# Patient Record
Sex: MALE | URBAN - METROPOLITAN AREA
[De-identification: names, ages, dates, MRNs, and addresses within clinical notes are randomized per-mention and may not be internally consistent; named-entity substitution may affect disease eponyms.]

---

## 2021-11-29 ENCOUNTER — OFFICE VISIT (OUTPATIENT)
Dept: URBAN - METROPOLITAN AREA CLINIC 36 | Facility: CLINIC | Age: 81
End: 2021-11-29

## 2021-11-29 DIAGNOSIS — H25.12 AGE-RELATED NUCLEAR CATARACT, LEFT EYE: ICD-10-CM

## 2021-11-29 DIAGNOSIS — H35.3231 EXUDATIVE AGE-REL MCLR DEGN, BI, WITH ACTV CHRDL NEOVAS: Primary | ICD-10-CM

## 2021-11-29 DIAGNOSIS — Z96.1 PRESENCE OF INTRAOCULAR LENS: ICD-10-CM

## 2021-11-29 PROCEDURE — 92134 CPTRZ OPH DX IMG PST SGM RTA: CPT | Performed by: OPHTHALMOLOGY

## 2021-11-29 PROCEDURE — 99203 OFFICE O/P NEW LOW 30 MIN: CPT | Performed by: OPHTHALMOLOGY

## 2021-11-29 ASSESSMENT — INTRAOCULAR PRESSURE
OD: 12
OS: 15

## 2021-11-29 NOTE — IMPRESSION/PLAN
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 Plan: Exam and OCT demonstrate PED OU and IRF OD and possible scar OS. 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 The diagnosis, natural history, and prognosis of wet AMD were discussed. The R/B/As of various treatment options including observation, laser (PDT), and intravitreal Anti-VEGF injections were discussed. Participation in a clinical trial was also discussed. The patient understands that treatment may not improve vision, but should reduce the risk of further visual loss. The patient also understands the likely need for chronic treatment and the risk of vision loss without treatment. Recommend intravitreal Avastin injection today. R/B/A discussed and patient elects to proceed. Will schedule due to insurance authorization. 

RTC 1-2 wk with OCT OU, straight Avastin #1/1

## 2021-11-29 NOTE — IMPRESSION/PLAN
Impression: Age-related nuclear cataract, left eye: H25.12. Plan:  May be VS; rec observation until AMD is treated OS

## 2021-12-13 ENCOUNTER — PROCEDURE (OUTPATIENT)
Dept: URBAN - METROPOLITAN AREA CLINIC 36 | Facility: CLINIC | Age: 81
End: 2021-12-13
Payer: MEDICARE

## 2021-12-13 PROCEDURE — 92134 CPTRZ OPH DX IMG PST SGM RTA: CPT | Performed by: OPHTHALMOLOGY

## 2021-12-13 ASSESSMENT — INTRAOCULAR PRESSURE
OD: 18
OS: 18

## 2022-01-10 ENCOUNTER — OFFICE VISIT (OUTPATIENT)
Dept: URBAN - METROPOLITAN AREA CLINIC 36 | Facility: CLINIC | Age: 82
End: 2022-01-10
Payer: MEDICARE

## 2022-01-10 PROCEDURE — 92014 COMPRE OPH EXAM EST PT 1/>: CPT | Performed by: OPHTHALMOLOGY

## 2022-01-10 ASSESSMENT — INTRAOCULAR PRESSURE
OS: 14
OD: 19

## 2022-01-10 NOTE — IMPRESSION/PLAN
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
-s/p Avastin last 12/13/2021 Plan: Exam and OCT demonstrate PED OU and improved IRF OD and improved SRH around scar OS after initiation of antiVEGF theraphy. Recommend intravitreal Avastin injection today. R/B/A discussed and patient elects to proceed. Intravitreal Avastin injected OU today without complication. 

RTC 4 wk with OCT OU, re-eval Avastin OU

## 2022-02-07 ENCOUNTER — OFFICE VISIT (OUTPATIENT)
Dept: URBAN - METROPOLITAN AREA CLINIC 36 | Facility: CLINIC | Age: 82
End: 2022-02-07
Payer: MEDICARE

## 2022-02-07 PROCEDURE — 92012 INTRM OPH EXAM EST PATIENT: CPT | Performed by: OPHTHALMOLOGY

## 2022-02-07 PROCEDURE — 92134 CPTRZ OPH DX IMG PST SGM RTA: CPT | Performed by: OPHTHALMOLOGY

## 2022-02-07 ASSESSMENT — INTRAOCULAR PRESSURE
OS: 20
OD: 17

## 2022-02-07 NOTE — IMPRESSION/PLAN
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
-s/p Avastin last 01/10/2022 Plan: Exam and OCT demonstrate PED OU and improved IRF OD and improved SRH around scar OS after initiation of antiVEGF therapy. Vision is improved. Recommend intravitreal Avastin injection today and continue with tx until cataract surgery OS. R/B/A discussed and patient elects to proceed. Intravitreal Avastin injected OU today without complication. 

RTC 4 wk with OCT OU, re-eval Avastin OU

## 2022-02-07 NOTE — IMPRESSION/PLAN
Impression: Age-related nuclear cataract, left eye: H25.12. Plan: May be VS; potential unclear given retina. Patient is cleared to proceed with cataract surgery from retina perspective if indicated.

## 2022-03-07 ENCOUNTER — OFFICE VISIT (OUTPATIENT)
Dept: URBAN - METROPOLITAN AREA CLINIC 36 | Facility: CLINIC | Age: 82
End: 2022-03-07
Payer: MEDICARE

## 2022-03-07 DIAGNOSIS — H35.373 PUCKERING OF MACULA, BILATERAL: ICD-10-CM

## 2022-03-07 DIAGNOSIS — H04.123 DRY EYE SYNDROME OF BILATERAL LACRIMAL GLANDS: ICD-10-CM

## 2022-03-07 PROCEDURE — 99214 OFFICE O/P EST MOD 30 MIN: CPT | Performed by: OPHTHALMOLOGY

## 2022-03-07 PROCEDURE — 92134 CPTRZ OPH DX IMG PST SGM RTA: CPT | Performed by: OPHTHALMOLOGY

## 2022-03-07 ASSESSMENT — INTRAOCULAR PRESSURE
OS: 18
OD: 15

## 2022-03-07 NOTE — IMPRESSION/PLAN
Impression: Dry eye syndrome of bilateral lacrimal glands: H04.123. Plan: Patient notes tearing. Exam demonstrates worse PEE. Discussed R/B/A of ATs, PFATs, Restasis, vs punctal plugs.  Rec ATs

## 2022-03-07 NOTE — IMPRESSION/PLAN
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
-s/p Avastin last 01/10/2022 Plan: Exam and OCT demonstrate PED OU and improved IRF OD and improved SRH around scar OS after initiation of antiVEGF therapy. Vision is improved. Recommend intravitreal Avastin injection today and continue with tx until cataract surgery OS. R/B/A discussed and patient elects to proceed. Intravitreal Avastin injected OU today without complication. 

RTC 4 wk with OCT OU, re-eval Avastin OU (MAY BE IN [place])

## 2022-03-07 NOTE — IMPRESSION/PLAN
Impression: Puckering of macula, bilateral: H35.373. Plan: Exam and OCT demonstrate a Macular Pucker. The diagnosis, natural history, and prognosis of ERMs, as well as the risks and benefits of PPV/MP versus observation were discussed at length. Given the patient's concurrent retinal pathology, observation was recommended at this time.

## 2022-09-20 ENCOUNTER — OFFICE VISIT (OUTPATIENT)
Dept: URBAN - METROPOLITAN AREA CLINIC 13 | Facility: CLINIC | Age: 82
End: 2022-09-20
Payer: MEDICARE

## 2022-09-20 DIAGNOSIS — H35.373 PUCKERING OF MACULA, BILATERAL: ICD-10-CM

## 2022-09-20 DIAGNOSIS — Z96.1 PRESENCE OF INTRAOCULAR LENS: ICD-10-CM

## 2022-09-20 DIAGNOSIS — H25.12 AGE-RELATED NUCLEAR CATARACT, LEFT EYE: ICD-10-CM

## 2022-09-20 DIAGNOSIS — H04.123 DRY EYE SYNDROME OF BILATERAL LACRIMAL GLANDS: ICD-10-CM

## 2022-09-20 DIAGNOSIS — H35.3231 EXUDATIVE AGE-REL MCLR DEGN, BI, WITH ACTV CHRDL NEOVAS: Primary | ICD-10-CM

## 2022-09-20 PROCEDURE — 92014 COMPRE OPH EXAM EST PT 1/>: CPT | Performed by: OPHTHALMOLOGY

## 2022-09-20 PROCEDURE — 67028 INJECTION EYE DRUG: CPT | Performed by: OPHTHALMOLOGY

## 2022-09-20 PROCEDURE — 92134 CPTRZ OPH DX IMG PST SGM RTA: CPT | Performed by: OPHTHALMOLOGY

## 2022-09-20 ASSESSMENT — INTRAOCULAR PRESSURE
OS: 15
OD: 14

## 2022-09-20 NOTE — IMPRESSION/PLAN
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
-s/p Avastin last 3/2022 Plan: Patient notes stable vision after Avastin #3. He did not receive antiVEGF tx in [place] and fortunately, he did not note a decline. Exam and OCT demonstrate PED OU and improved IRF OD and improved SRH around scar OS after initiation of antiVEGF therapy. Vision is improved. Recommend intravitreal Avastin injection today and continue with tx at a long tx interval.  R/B/A discussed and patient elects to proceed. Intravitreal Avastin injected OU today without complication. 

RTC 3-4 mo with OCT OU, re-eval Avastin OU (MAY BE IN [place])

## 2022-12-22 ENCOUNTER — OFFICE VISIT (OUTPATIENT)
Dept: URBAN - METROPOLITAN AREA CLINIC 32 | Facility: CLINIC | Age: 82
End: 2022-12-22
Payer: MEDICARE

## 2022-12-22 DIAGNOSIS — H52.4 PRESBYOPIA: ICD-10-CM

## 2022-12-22 DIAGNOSIS — H35.373 PUCKERING OF MACULA, BILATERAL: Primary | ICD-10-CM

## 2022-12-22 DIAGNOSIS — H54.50 LOW VISION, ONE EYE, UNSPECIFIED EYE: ICD-10-CM

## 2022-12-22 PROCEDURE — 99203 OFFICE O/P NEW LOW 30 MIN: CPT | Performed by: OPTOMETRIST

## 2022-12-22 ASSESSMENT — VISUAL ACUITY
OD: 20/250
OS: CF 5FT

## 2022-12-22 ASSESSMENT — INTRAOCULAR PRESSURE
OD: 10
OS: 11

## 2022-12-22 ASSESSMENT — KERATOMETRY: OS: 40.50

## 2022-12-22 NOTE — IMPRESSION/PLAN
Impression: Puckering of macula, bilateral: H35.373. Plan: Discussed diagnosis in detail with patient. Continue care with Dr. Yohana Vega.

## 2022-12-22 NOTE — IMPRESSION/PLAN
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 Plan: Finalized new glasses Rx. Patient education on appropriate options of eye glasses. Gave high add bifocal RX. Recommend ADL consider magnifiers if needed. Recommend: Aubrey 4x Hand Magnifier, Aubrey Smart [employer], LUCY Lights(often available at [employer] or [employer]), Large TV 65'' to 82,'' Audible Books, MaxTV/Max Detail Discussed: ORCAM, iPhone/iPad, Audible Assistants([employer])

## 2023-01-09 ENCOUNTER — OFFICE VISIT (OUTPATIENT)
Dept: URBAN - METROPOLITAN AREA CLINIC 36 | Facility: CLINIC | Age: 83
End: 2023-01-09
Payer: MEDICARE

## 2023-01-09 DIAGNOSIS — Z96.1 PRESENCE OF INTRAOCULAR LENS: ICD-10-CM

## 2023-01-09 DIAGNOSIS — H04.123 DRY EYE SYNDROME OF BILATERAL LACRIMAL GLANDS: ICD-10-CM

## 2023-01-09 DIAGNOSIS — H25.12 AGE-RELATED NUCLEAR CATARACT, LEFT EYE: ICD-10-CM

## 2023-01-09 DIAGNOSIS — H35.373 PUCKERING OF MACULA, BILATERAL: ICD-10-CM

## 2023-01-09 DIAGNOSIS — H35.3231 EXUDATIVE AGE-REL MCLR DEGN, BI, WITH ACTV CHRDL NEOVAS: Primary | ICD-10-CM

## 2023-01-09 PROCEDURE — 92134 CPTRZ OPH DX IMG PST SGM RTA: CPT | Performed by: OPHTHALMOLOGY

## 2023-01-09 PROCEDURE — 99214 OFFICE O/P EST MOD 30 MIN: CPT | Performed by: OPHTHALMOLOGY

## 2023-01-09 ASSESSMENT — INTRAOCULAR PRESSURE
OD: 12
OS: 17

## 2023-01-09 NOTE — IMPRESSION/PLAN
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
-s/p Avastin last 09/20/2022 Plan: Patient notes stable vision after Avastin #3. He did not receive antiVEGF tx in [place] and fortunately, he did not note a decline. Exam and OCT demonstrate PED OU and improved IRF OD and improved SRH around scar OS after initiation of antiVEGF therapy. Vision is improved. Recommend intravitreal Avastin injection today and continue with tx at a long tx interval.  R/B/A discussed and patient elects to proceed. Intravitreal Avastin injected OU today without complication. 

RTC 3-4 mo with OCT OU, re-eval Avastin OU (MAY BE IN [place])

## 2023-05-01 ENCOUNTER — OFFICE VISIT (OUTPATIENT)
Dept: URBAN - METROPOLITAN AREA CLINIC 36 | Facility: CLINIC | Age: 83
End: 2023-05-01
Payer: MEDICARE

## 2023-05-01 DIAGNOSIS — H35.373 PUCKERING OF MACULA, BILATERAL: ICD-10-CM

## 2023-05-01 DIAGNOSIS — H35.3231 EXUDATIVE AGE-REL MCLR DEGN, BI, WITH ACTV CHRDL NEOVAS: Primary | ICD-10-CM

## 2023-05-01 DIAGNOSIS — Z96.1 PRESENCE OF INTRAOCULAR LENS: ICD-10-CM

## 2023-05-01 DIAGNOSIS — H04.123 DRY EYE SYNDROME OF BILATERAL LACRIMAL GLANDS: ICD-10-CM

## 2023-05-01 PROCEDURE — 99214 OFFICE O/P EST MOD 30 MIN: CPT | Performed by: OPHTHALMOLOGY

## 2023-05-01 PROCEDURE — 92134 CPTRZ OPH DX IMG PST SGM RTA: CPT | Performed by: OPHTHALMOLOGY

## 2023-05-01 ASSESSMENT — INTRAOCULAR PRESSURE
OS: 17
OD: 16

## 2023-05-01 NOTE — IMPRESSION/PLAN
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
-s/p Avastin last 01/09/2023 Plan: Patient notes stable vision after Avastin. Exam and OCT demonstrate PED OU and persistent IRF OD and improved SRH around scar OS after initiation of antiVEGF therapy. Vision is improved. Recommend switch to Vabysmo injection today and continue with tx at a long tx interval.  R/B/A discussed and patient elects to proceed. Intravitreal Vabysmo injected OU today without complication. 

RTC 3-4 mo with OCT OU, re-eval Vabysmo OU (MAY BE IN [place])

## 2023-12-21 ENCOUNTER — OFFICE VISIT (OUTPATIENT)
Dept: URBAN - METROPOLITAN AREA CLINIC 35 | Facility: CLINIC | Age: 83
End: 2023-12-21
Payer: MEDICARE

## 2023-12-21 DIAGNOSIS — H35.3231 EXUDATIVE AGE-REL MCLR DEGN, BI, WITH ACTV CHRDL NEOVAS: Primary | ICD-10-CM

## 2023-12-21 DIAGNOSIS — H35.373 PUCKERING OF MACULA, BILATERAL: ICD-10-CM

## 2023-12-21 PROCEDURE — 92134 CPTRZ OPH DX IMG PST SGM RTA: CPT | Performed by: OPHTHALMOLOGY

## 2023-12-21 PROCEDURE — 99213 OFFICE O/P EST LOW 20 MIN: CPT | Performed by: OPHTHALMOLOGY

## 2023-12-21 ASSESSMENT — INTRAOCULAR PRESSURE
OS: 10
OD: 13

## 2024-09-30 ENCOUNTER — OFFICE VISIT (OUTPATIENT)
Facility: LOCATION | Age: 84
End: 2024-09-30
Payer: MEDICARE

## 2024-09-30 DIAGNOSIS — H04.123 DRY EYE SYNDROME OF BILATERAL LACRIMAL GLANDS: ICD-10-CM

## 2024-09-30 DIAGNOSIS — Z96.1 PRESENCE OF INTRAOCULAR LENS: ICD-10-CM

## 2024-09-30 DIAGNOSIS — H35.373 PUCKERING OF MACULA, BILATERAL: ICD-10-CM

## 2024-09-30 DIAGNOSIS — H35.3231 EXUDATIVE AGE-RELATED MACULAR DEGENERATION, BILATERAL, WITH ACTIVE CHOROIDAL NEOVASCULARIZATION: Primary | ICD-10-CM

## 2024-09-30 PROCEDURE — 92134 CPTRZ OPH DX IMG PST SGM RTA: CPT | Performed by: OPHTHALMOLOGY

## 2024-09-30 PROCEDURE — 92014 COMPRE OPH EXAM EST PT 1/>: CPT | Performed by: OPHTHALMOLOGY

## 2024-09-30 ASSESSMENT — INTRAOCULAR PRESSURE
OS: 18
OD: 14

## 2025-02-03 ENCOUNTER — OFFICE VISIT (OUTPATIENT)
Facility: LOCATION | Age: 85
End: 2025-02-03
Payer: MEDICARE

## 2025-02-03 DIAGNOSIS — H35.373 PUCKERING OF MACULA, BILATERAL: ICD-10-CM

## 2025-02-03 DIAGNOSIS — Z96.1 PRESENCE OF INTRAOCULAR LENS: ICD-10-CM

## 2025-02-03 DIAGNOSIS — H35.3231 EXUDATIVE AGE-RELATED MACULAR DEGENERATION, BILATERAL, WITH ACTIVE CHOROIDAL NEOVASCULARIZATION: Primary | ICD-10-CM

## 2025-02-03 DIAGNOSIS — H04.123 DRY EYE SYNDROME OF BILATERAL LACRIMAL GLANDS: ICD-10-CM

## 2025-02-03 PROCEDURE — 92014 COMPRE OPH EXAM EST PT 1/>: CPT | Performed by: OPHTHALMOLOGY

## 2025-02-03 PROCEDURE — 92134 CPTRZ OPH DX IMG PST SGM RTA: CPT | Performed by: OPHTHALMOLOGY

## 2025-02-03 ASSESSMENT — INTRAOCULAR PRESSURE
OS: 7
OD: 7